# Patient Record
Sex: FEMALE | Race: OTHER | HISPANIC OR LATINO | Employment: UNEMPLOYED | URBAN - METROPOLITAN AREA
[De-identification: names, ages, dates, MRNs, and addresses within clinical notes are randomized per-mention and may not be internally consistent; named-entity substitution may affect disease eponyms.]

---

## 2017-03-07 ENCOUNTER — TELEPHONE (OUTPATIENT)
Dept: HEMATOLOGY/ONCOLOGY | Facility: CLINIC | Age: 47
End: 2017-03-07

## 2017-03-07 NOTE — TELEPHONE ENCOUNTER
Called and spoke with patient, advised that per dr. العلي patient doesn't need to consult with hematology. Explained to patient about small platelets. Patient to call with any further questions or concerns.

## 2017-03-07 NOTE — TELEPHONE ENCOUNTER
Called patient to find out why patient was being referred, printed lab work and explained would have dr. العلي review and call back with an appointment

## 2017-03-07 NOTE — TELEPHONE ENCOUNTER
----- Message from Umberto Miranda sent at 3/7/2017  9:03 AM CST -----  Contact: ,eunice Hicks wants to speak with a nurse regarding scheduling appointment please call back at [phone]

## 2017-06-07 ENCOUNTER — INITIAL CONSULT (OUTPATIENT)
Dept: RHEUMATOLOGY | Facility: CLINIC | Age: 47
End: 2017-06-07
Payer: COMMERCIAL

## 2017-06-07 VITALS
HEART RATE: 64 BPM | WEIGHT: 149.5 LBS | HEIGHT: 66 IN | DIASTOLIC BLOOD PRESSURE: 80 MMHG | BODY MASS INDEX: 24.03 KG/M2 | SYSTOLIC BLOOD PRESSURE: 124 MMHG

## 2017-06-07 DIAGNOSIS — M70.62 GREATER TROCHANTERIC BURSITIS OF BOTH HIPS: Primary | ICD-10-CM

## 2017-06-07 DIAGNOSIS — M79.18 PIRIFORMIS MUSCLE PAIN: ICD-10-CM

## 2017-06-07 DIAGNOSIS — M70.61 GREATER TROCHANTERIC BURSITIS OF BOTH HIPS: Primary | ICD-10-CM

## 2017-06-07 DIAGNOSIS — R21 RASH AND NONSPECIFIC SKIN ERUPTION: ICD-10-CM

## 2017-06-07 DIAGNOSIS — M79.10 MYALGIA: ICD-10-CM

## 2017-06-07 PROCEDURE — 99999 PR PBB SHADOW E&M-EST. PATIENT-LVL III: CPT | Mod: PBBFAC,,, | Performed by: INTERNAL MEDICINE

## 2017-06-07 PROCEDURE — 99205 OFFICE O/P NEW HI 60 MIN: CPT | Mod: S$GLB,,, | Performed by: INTERNAL MEDICINE

## 2017-06-07 NOTE — PROGRESS NOTES
Subjective:          Chief Complaint: Joanna Purcell is a 47 y.o. female who had concerns including Disease Management.    HPI:    She is a 47-year-old female was complaining of pain described as being in the bones in the knees is rather new in onset since December 2016. She notes pain is worse at night especially in the thighs, knees, and calves. She is having some stiffness   Patient was complaining also of fatigue.  Is having muscle aches and pains she tried over-the-counter topical analgesics, Aleve, Epsom baths with no significant improvement.  She has rash on forearm did have bx and was negative. Given topical creams no significant change-?  No weakness with rising. Exacerbated with stairs, and sleeping on sides. Ok sleeping on back. Walking is ok for shorter distance. No back pain.   Left hand stiff in AM. Salon pas with lidocaine does help. No benefit with Flexeril.   Bilateral froearm rash has been biopsied with no mention of diagnosis-not photosensitivie  Patient denies weight loss, rashes, dry eye, dry mouth, nasal or palatal ulcerations,  lymphadenopathy, Raynaud's, hx of DVT/miscarriages, psoriasis or family hx of psoriasis, rashes, serositis, anemia or other constitutional symptoms.    Did work with daughter with PT exercises that seem to help some  She has seen Dr. Cancino' negative eval for hip and knee  Dr. Sun with EMG/NCS no abnormality.    Component      Latest Ref Rng & Units 3/1/2017   CRP      0.00 - 0.90 mg/dL 0.70   ESTHELA Screen      None Detected None Detected   Rheumatoid Factor      0.0 - 15.0 IU/mL <8.6   Sed Rate      0 - 19 mm/Hr 16       REVIEW OF SYSTEMS:    Review of Systems   Constitutional: Negative for fever, malaise/fatigue and weight loss.   HENT: Negative for sore throat.    Eyes: Negative for double vision, photophobia and redness.   Respiratory: Negative for cough, shortness of breath and wheezing.    Cardiovascular: Negative for chest pain, palpitations and orthopnea.    Gastrointestinal: Negative for abdominal pain, constipation and diarrhea.   Genitourinary: Negative for dysuria, hematuria and urgency.   Musculoskeletal: Positive for myalgias. Negative for back pain and joint pain.   Skin: Positive for rash.   Neurological: Negative for dizziness, tingling, focal weakness and headaches.   Endo/Heme/Allergies: Does not bruise/bleed easily.   Psychiatric/Behavioral: Negative for depression, hallucinations and suicidal ideas.               Objective:            Past Medical History:   Diagnosis Date    Chicken pox     GERD (gastroesophageal reflux disease)     Hepatitis      Family History   Problem Relation Age of Onset    Family history unknown: Yes     Social History   Substance Use Topics    Smoking status: Never Smoker    Smokeless tobacco: Not on file    Alcohol use Yes         Current Outpatient Prescriptions on File Prior to Visit   Medication Sig Dispense Refill    cetirizine (ZYRTEC) 10 MG tablet ZYRTEC ALLERGY TABS      naproxen sodium (ALEVE) 220 mg Cap ALEVE 220 MG CAPS      SOOLANTRA 1 % Crea Apply 1 application topically once daily. Apply to face  5    vitamin D 1000 units Tab Take 1,000 Units by mouth once daily.      cyclobenzaprine (FLEXERIL) 5 MG tablet Tab 1 po at HS for leg cramps.  May cause drowsiness. 5 tablet 1     No current facility-administered medications on file prior to visit.        Vitals:    06/07/17 1513   BP: 124/80   Pulse: 64       Physical Exam:    Physical Exam   Constitutional: She appears well-developed and well-nourished.   HENT:   Nose: No septal deviation.   Mouth/Throat: Mucous membranes are normal. No oral lesions.   Eyes: Pupils are equal, round, and reactive to light. Right conjunctiva is not injected. Left conjunctiva is not injected.   Neck: No JVD present. No thyroid mass and no thyromegaly present.   Cardiovascular: Normal rate, regular rhythm and normal pulses.    No edema   Pulmonary/Chest: Effort normal and breath  sounds normal.   Abdominal: Soft. Normal appearance. There is no hepatosplenomegaly.   Musculoskeletal:        Right shoulder: She exhibits normal range of motion, no tenderness and no swelling.        Left shoulder: She exhibits normal range of motion, no tenderness and no swelling.        Right elbow: She exhibits normal range of motion and no swelling. No tenderness found.        Left elbow: She exhibits normal range of motion and no swelling. No tenderness found.        Right wrist: She exhibits normal range of motion, no tenderness and no swelling.        Left wrist: She exhibits normal range of motion, no tenderness and no swelling.        Right hip: She exhibits normal range of motion, normal strength and no swelling.        Left hip: She exhibits normal range of motion, no tenderness and no swelling.        Right knee: She exhibits normal range of motion and no swelling. No tenderness found.        Left knee: She exhibits normal range of motion and no swelling. No tenderness found.        Right ankle: She exhibits normal range of motion and no swelling. No tenderness.        Left ankle: She exhibits normal range of motion and no swelling. No tenderness.   Slight tenderness at the greater trochanter and along ITB but not suggestive of gr. Troch burstitis. ? Sciatica with pirformis. \    Hip ROM bilaterally no pain or restriction in ROM  Knee no pain or restriciton in ROM  SLE neg bilaterally     Lymphadenopathy:     She has no cervical adenopathy.     She has no axillary adenopathy.   Neurological: She has normal strength and normal reflexes.   Skin: Skin is dry and intact.        Psychiatric: She has a normal mood and affect.             Assessment:       Encounter Diagnoses   Name Primary?    Greater trochanteric bursitis of both hips Yes    Myalgia     Piriformis muscle pain     Rash and nonspecific skin eruption           Plan:        Greater trochanteric bursitis of both hips  -     Ambulatory  consult to Physical Therapy    Myalgia  -     CK; Future; Expected date: 06/07/2017  -     Aldolase; Future; Expected date: 06/07/2017  -     Lactate dehydrogenase; Future; Expected date: 06/07/2017  -     Ambulatory consult to Physical Therapy    Piriformis muscle pain  -     Ambulatory consult to Physical Therapy    Rash and nonspecific skin eruption  -     ESTHELA; Future; Expected date: 06/07/2017    Very pleasant 48 y/o female with bilateral leg/buttock ache that is dull and persistent x 6 months. No weakness on exam. She is slightly tender at the piriformis and greater trochanter with no LBP ? Piriformis syndrome with sciatica. She is not having much improvement with NSAIDs, rec trial with PT. Continue with salon pas with lidocaine.    Regarding rash on her forearm with negative bx not likely SCLE but will get path report.   Overall complaint is not consistent with CTD I suspect this is mechanical.       Return if symptoms worsen or fail to improve.      60min consultation with greater than 50% spent in counseling, chart review and coordination of care. All questions answered.  Thank you for allowing me to participate in the care of this very pleasant patient.

## 2017-06-07 NOTE — LETTER
June 7, 2017      Matthew Hennessy DO  [address]  [address]  [place] [address]           [facility]  [address] [facility] [address] [place] [address]  Phone: [phone]  Fax: [phone]          Patient: Joanna Purcell   MR Number: [id number]   YOB: 1970   Date of Visit: 6/7/2017       Dear Dr. Matthew Hennessy:    Thank you for referring Joanna Purcell to me for evaluation. Attached you will find relevant portions of my assessment and plan of care.    If you have questions, please do not hesitate to call me. I look forward to following Joanna Purcell along with you.    Sincerely,    Lisy Melara,     Enclosure  CC:  No Recipients    If you would like to receive this communication electronically, please contact [email or web address] or [phone] to request more information on [employer] access.    For providers and/or their staff who would like to refer a patient to [facility], please contact us through our one-stop-shop provider referral line, [facility] [place], at [phone].    If you feel you have received this communication in error or would no longer like to receive these types of communications, please e-mail [email or web address]

## 2017-06-27 ENCOUNTER — TELEPHONE (OUTPATIENT)
Dept: RHEUMATOLOGY | Facility: CLINIC | Age: 47
End: 2017-06-27

## 2017-06-27 NOTE — TELEPHONE ENCOUNTER
----- Message from Isabel Hampton LPN sent at 6/15/2017 12:17 PM CDT -----  Patient asking for lab results. Thanks-CG

## 2019-09-25 PROBLEM — Z00.00 WELL ADULT EXAM: Status: ACTIVE | Noted: 2019-09-25

## 2019-10-22 PROBLEM — L98.9 SKIN LESION: Status: ACTIVE | Noted: 2019-10-22

## 2019-12-30 PROBLEM — Z00.00 WELL ADULT EXAM: Status: RESOLVED | Noted: 2019-09-25 | Resolved: 2019-12-30

## 2021-08-30 PROBLEM — Z00.00 WELL ADULT EXAM: Status: RESOLVED | Noted: 2019-09-25 | Resolved: 2021-08-30

## 2021-09-22 ENCOUNTER — CLINICAL SUPPORT (OUTPATIENT)
Dept: URGENT CARE | Facility: CLINIC | Age: 51
End: 2021-09-22
Payer: COMMERCIAL

## 2021-09-22 DIAGNOSIS — Z01.818 PRE-OP TESTING: Primary | ICD-10-CM

## 2021-09-22 LAB
SARS-COV-2 RNA RESP QL NAA+PROBE: NOT DETECTED
SARS-COV-2- CYCLE NUMBER: NORMAL

## 2021-09-22 PROCEDURE — 99211 OFF/OP EST MAY X REQ PHY/QHP: CPT | Mod: S$GLB,,, | Performed by: FAMILY MEDICINE

## 2021-09-22 PROCEDURE — 99211 PR OFFICE/OUTPT VISIT, EST, LEVL I: ICD-10-PCS | Mod: S$GLB,,, | Performed by: FAMILY MEDICINE

## 2021-09-22 PROCEDURE — U0005 INFEC AGEN DETEC AMPLI PROBE: HCPCS | Performed by: ORTHOPAEDIC SURGERY

## 2021-09-22 PROCEDURE — U0003 INFECTIOUS AGENT DETECTION BY NUCLEIC ACID (DNA OR RNA); SEVERE ACUTE RESPIRATORY SYNDROME CORONAVIRUS 2 (SARS-COV-2) (CORONAVIRUS DISEASE [COVID-19]), AMPLIFIED PROBE TECHNIQUE, MAKING USE OF HIGH THROUGHPUT TECHNOLOGIES AS DESCRIBED BY CMS-2020-01-R: HCPCS | Performed by: ORTHOPAEDIC SURGERY

## 2021-09-24 PROBLEM — L72.0 EPITHELIAL INCLUSION CYST: Status: ACTIVE | Noted: 2021-09-24

## 2022-04-25 PROBLEM — Z00.00 ANNUAL PHYSICAL EXAM: Status: ACTIVE | Noted: 2022-04-25

## 2022-04-25 PROBLEM — K21.9 GERD WITHOUT ESOPHAGITIS: Status: ACTIVE | Noted: 2022-04-25

## 2022-04-25 PROBLEM — Z86.16 HISTORY OF 2019 NOVEL CORONAVIRUS DISEASE (COVID-19): Status: ACTIVE | Noted: 2022-04-25

## 2022-06-22 PROBLEM — Z00.00 ANNUAL PHYSICAL EXAM: Status: RESOLVED | Noted: 2022-04-25 | Resolved: 2022-06-22

## 2022-06-22 PROBLEM — L72.0 EPITHELIAL INCLUSION CYST: Status: RESOLVED | Noted: 2021-09-24 | Resolved: 2022-06-22

## 2022-06-22 PROBLEM — Z86.16 HISTORY OF 2019 NOVEL CORONAVIRUS DISEASE (COVID-19): Status: RESOLVED | Noted: 2022-04-25 | Resolved: 2022-06-22
